# Patient Record
Sex: FEMALE | Race: WHITE | NOT HISPANIC OR LATINO | Employment: UNEMPLOYED | URBAN - METROPOLITAN AREA
[De-identification: names, ages, dates, MRNs, and addresses within clinical notes are randomized per-mention and may not be internally consistent; named-entity substitution may affect disease eponyms.]

---
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
## 2021-01-12 ENCOUNTER — VIRTUAL VISIT (OUTPATIENT)
Dept: PSYCHOLOGY | Facility: CLINIC | Age: 21
End: 2021-01-12
Payer: COMMERCIAL

## 2021-01-12 DIAGNOSIS — F41.1 GENERALIZED ANXIETY DISORDER: Primary | ICD-10-CM

## 2021-01-12 PROCEDURE — 90834 PSYTX W PT 45 MINUTES: CPT | Mod: GT | Performed by: SOCIAL WORKER

## 2021-01-13 NOTE — PROGRESS NOTES
Progress Note    Patient Name: Eli Hood  Date: 1/12/2021     Service Type: Individual      Session Start Time: 12:30 PM  Session End Time: 1:20 PM     Session Length: 50 Minutes    Session #: 4 (Third Episode of Care)    Attendees: Client    Service Modality:  Video Visit    Telemedicine Visit: The client's condition can be safely assessed and treated via synchronous audio and visual telemedicine encounter.      Reason for Telemedicine Visit: The client was only offered a telehealth visit.    Originating Site (Client Location): Client's Home    Distant Site (Provider Location): Provider Remote Setting    Consent:  The client has verbally consented to the potential risks and benefits of telemedicine (video visit) versus in person care. She agreed that her insurance would be billed. She also agreed to make self-payment for services provided, if needed, and she agreed to take responsibility for payment of non-covered services.     The client would like the video invitation sent by: Text to cell phone: [phone]    Mode of Communication:  Video Conference via [employer]    As the provider, I attest to compliance with applicable laws and regulations related to telemedicine.     Treatment Plan Last Reviewed: 11/24/2020  PHQ-9 / LYNNE-7 : Not Completed    DATA  Interactive Complexity: No  Crisis: No       Progress Since Last Session (Related to Symptoms / Goals / Homework):   Symptoms: Worsening : The client reported experiencing sleep disturbance since moving back into the college dorm.    Homework: Achieved / completed to satisfaction. The client reported that she has been using the relaxation exercise daily.      Episode of Care Goals: No improvement - ACTION (Actively working towards change); Intervened by reinforcing change plan / affirming steps taken.     Current / Ongoing Stressors and Concerns:   The client reported that her stress has increased.     Treatment  Objective(s) Addressed in This Session:   The client will learn and apply skills to manage the symptoms of anxiety that she has been experiencing.     Intervention:   Talked about the client returning to live on the college campus. Examined her sleep disturbance. Normalized her body and mind adjusting to sleeping in a new environment. Discussed and reinforced her efforts to use the relaxation exercise daily. Presented didactic information about sleep hygiene. Obtained a commitment from the client to follow the sleep hygiene techniques to improve her sleep.      ASSESSMENT: Current Emotional / Mental Status (status of significant symptoms):   Risk status (Self / Other harm or suicidal ideation)   Client denies current fears or concerns for personal safety.   Client denies current or recent suicidal ideation or behaviors.   Client denies current or recent homicidal ideation or behaviors.   Client denies current or recent self injurious behavior or ideation.   Client denies other safety concerns.   Client reports there has been no change in risk factors since her last session.     Client reports there has been a change in protective factors since her last session. She states that she moved back into the college dorm on 1/9/2021. She indicates that she likes her college classes so far.    Recommended that the client call 911 or go to the local emergency department should there be a change in any of these risk factors.     Appearance:   Appropriate    Eye Contact:   Good    Psychomotor Behavior: Normal    Attitude:   Cooperative    Orientation:   All   Speech    Rate / Production: Normal/ Responsive    Volume:  Normal    Mood:    Normal   Affect:    Appropriate    Thought Content:  Clear    Thought Form:  Coherent  Logical    Insight:    Good      Medication Review:   No current psychiatric medications prescribed.     Medication Compliance:   N/A     Changes in Health Issues:   None reported.     Chemical Use  Review:   Substance Use: Chemical use reviewed. No active concerns identified.      Tobacco Use: No current tobacco use.      Diagnosis:  1. Generalized anxiety disorder      Collateral Reports Completed:   Not Applicable    PLAN: (Patient Tasks / Therapist Tasks / Other)  Follow the sleep hygiene techniques to improve her sleep.        JENN Rosario, MALISSA.                                                         ______________________________________________________________________    Treatment Plan     Client's Name: Eli Hood                    YOB: 2000     Date: 11/24/2020     DSM-V Diagnoses:      300.02 (F41.1) Generalized Anxiety Disorder     Psychosocial / Contextual Factors: The client is a [age] year old,  female. The client resides with her mother, her father and her two sisters in a home in [place], [place]. The client is a sophomore in college. She is currently taking online classes. The client is employed part-time as a  at [employer]. The client reported that she began noticing symptoms of anxiety during her sophomore year of high school. The client reported that she would like assistance learning how to manage the symptoms of anxiety that she has been experiencing.     WHODAS: Not Completed     Referral / Collaboration:  Referral to another professional/service is not indicated at this time.     Anticipated number of session or this episode of care: 12     MeasurableTreatment Goal(s) related to diagnosis / functional impairment(s)  Goal 1: The client will learn and apply skills to manage the symptoms of anxiety that she has been experiencing.    I will know I've met my goal when I don't worry so much.       Objective #A                The client will identify specific fears / thoughts that contribute to feeling anxious.  Status: Continued - Date(s): 11/24/2020      Intervention(s)  Therapist will assist the client in identifying specific  "thoughts/fears that contribute to feeling anxious.     Objective #B  The client will use  worry time  each day for 30 minutes of scheduled worry and then defer obsessive or anxious thinking until the next structured worry time.  Status: Continued - Date(s): 11/24/2020      Intervention(s)  Therapist will teach and support the client in learning and using \"worry time\".     Objective #C  The client will use relaxation strategies two times per day to reduce the physical symptoms of anxiety.  Status: Continued - Date(s): 11/24/2020     Intervention(s)  Therapist will teach and support the client in learning and using relaxation strategies.     Objective #D  The client will use cognitive strategies identified in therapy to challenge anxious thoughts.                      Status: Continued - Date(s): 11/24/2020      Intervention(s)  Therapist will assist the client in learning and applying cognitive strategies to challenge anxious thoughts.     Objective #E  The client will be mindful of the body sensations that she is experiencing when she is feeling anxious.  Status: Continued - Date(s): 11/24/2020      Intervention(s)  Therapist will  and support the client in being mindful of the body sensations that she is experiencing when she is feeling anxious.     Objective #F  The client will learn how to use her senses to ground herself in her body.  Status: Continued - Date(s): 11/24/2020      Intervention(s)  Therapist will  and support the client in learning how to use her senses to ground herself in her body.        The client has verbally agreed to the above plan.        OMEGA Rosario.S.ELIAN., L.I.C.S.W.              November 25, 2020           "

## 2021-02-17 ENCOUNTER — VIRTUAL VISIT (OUTPATIENT)
Dept: PSYCHOLOGY | Facility: CLINIC | Age: 21
End: 2021-02-17
Payer: COMMERCIAL

## 2021-02-17 DIAGNOSIS — F41.1 GENERALIZED ANXIETY DISORDER: Primary | ICD-10-CM

## 2021-02-17 PROCEDURE — 90834 PSYTX W PT 45 MINUTES: CPT | Mod: GT | Performed by: SOCIAL WORKER

## 2021-02-19 NOTE — PROGRESS NOTES
Progress Note    Patient Name: Eli Hood  Date: 2/17/2021     Service Type: Individual      Session Start Time: 11:30 AM  Session End Time: 12:20 PM     Session Length: 50 Minutes    Session #: 5 (Third Episode of Care)    Attendees: Client    Service Modality:  Video Visit    Telemedicine Visit: The client's condition can be safely assessed and treated via synchronous audio and visual telemedicine encounter.      Reason for Telemedicine Visit: The client was only offered a telehealth visit.    Originating Site (Client Location): Client's Home    Distant Site (Provider Location): Provider Remote Setting    Consent:  The client has verbally consented to the potential risks and benefits of telemedicine (video visit) versus in person care. She agreed that her insurance would be billed. She also agreed to make self-payment for services provided, if needed, and she agreed to take responsibility for payment of non-covered services.     The client would like the video invitation sent by: Text to cell phone: [phone]    Mode of Communication:  Video Conference via Appsfire    As the provider, I attest to compliance with applicable laws and regulations related to telemedicine.     Treatment Plan Last Reviewed: 11/24/2020  PHQ-9 / LYNNE-7 : Not Completed    DATA  Interactive Complexity: No  Crisis: No       Progress Since Last Session (Related to Symptoms / Goals / Homework):   Symptoms: Improving : The client reported that her sleep has improved.    Homework: Achieved / completed to satisfaction. The client reported that she did follow the sleep hygiene techniques to improve her sleep.       Episode of Care Goals: Satisfactory progress - ACTION (Actively working towards change); Intervened by reinforcing change plan / affirming steps taken.     Current / Ongoing Stressors and Concerns:   The client reported that her stress has decreased.     Treatment Objective(s) Addressed  in This Session:   The client will learn and apply skills to manage the symptoms of anxiety that she has been experiencing.     Intervention:   Talked about the client's recent stressors including gastrointestinal problems. Discussed and reinforced the client's efforts to follow the sleep hygiene techniques to improve her sleep. Began examining the client's fear of failure.      ASSESSMENT: Current Emotional / Mental Status (status of significant symptoms):   Risk status (Self / Other harm or suicidal ideation)   Client denies current fears or concerns for personal safety.   Client denies current or recent suicidal ideation or behaviors.   Client denies current or recent homicidal ideation or behaviors.   Client denies current or recent self injurious behavior or ideation.   Client denies other safety concerns.   Client reports there has been a change in risk factors since her last session. She states that she has been having gastrointestinal problems.   Client reports there has been a change in protective factors since her last session. She states that she has been socializing regularly with friends.    Recommended that the client call 911 or go to the local emergency department should there be a change in any of these risk factors.     Appearance:   Appropriate    Eye Contact:   Good    Psychomotor Behavior: Normal    Attitude:   Cooperative    Orientation:   All   Speech    Rate / Production: Normal/ Responsive    Volume:  Normal    Mood:    Normal   Affect:    Appropriate    Thought Content:  Clear    Thought Form:  Coherent  Logical    Insight:    Good      Medication Review:   No current psychiatric medications prescribed.     Medication Compliance:   N/A     Changes in Health Issues:   None reported.     Chemical Use Review:   Substance Use: Chemical use reviewed. No active concerns identified.      Tobacco Use: No current tobacco use.      Diagnosis:  1. Generalized anxiety disorder      Collateral Reports  Completed:   Not Applicable    PLAN: (Patient Tasks / Therapist Tasks / Other)  N/A        ANALIA Rosario., MALISSA.                                                         ______________________________________________________________________    Treatment Plan     Client's Name: Eli Hood                    YOB: 2000     Date: 11/24/2020     DSM-V Diagnoses:      300.02 (F41.1) Generalized Anxiety Disorder     Psychosocial / Contextual Factors: The client is a [age] year old,  female. The client resides with her mother, her father and her two sisters in a home in [place], [place]. The client is a sophomore in college. She is currently taking online classes. The client is employed part-time as a  at [employer]. The client reported that she began noticing symptoms of anxiety during her sophomore year of high school. The client reported that she would like assistance learning how to manage the symptoms of anxiety that she has been experiencing.     WHODAS: Not Completed     Referral / Collaboration:  Referral to another professional/service is not indicated at this time.     Anticipated number of session or this episode of care: 12     MeasurableTreatment Goal(s) related to diagnosis / functional impairment(s)  Goal 1: The client will learn and apply skills to manage the symptoms of anxiety that she has been experiencing.    I will know I've met my goal when I don't worry so much.       Objective #A                The client will identify specific fears / thoughts that contribute to feeling anxious.  Status: Continued - Date(s): 11/24/2020      Intervention(s)  Therapist will assist the client in identifying specific thoughts/fears that contribute to feeling anxious.     Objective #B  The client will use  worry time  each day for 30 minutes of scheduled worry and then defer obsessive or anxious thinking until the next structured worry time.  Status: Continued -  "Date(s): 11/24/2020      Intervention(s)  Therapist will teach and support the client in learning and using \"worry time\".     Objective #C  The client will use relaxation strategies two times per day to reduce the physical symptoms of anxiety.  Status: Continued - Date(s): 11/24/2020     Intervention(s)  Therapist will teach and support the client in learning and using relaxation strategies.     Objective #D  The client will use cognitive strategies identified in therapy to challenge anxious thoughts.                      Status: Continued - Date(s): 11/24/2020      Intervention(s)  Therapist will assist the client in learning and applying cognitive strategies to challenge anxious thoughts.     Objective #E  The client will be mindful of the body sensations that she is experiencing when she is feeling anxious.  Status: Continued - Date(s): 11/24/2020      Intervention(s)  Therapist will  and support the client in being mindful of the body sensations that she is experiencing when she is feeling anxious.     Objective #F  The client will learn how to use her senses to ground herself in her body.  Status: Continued - Date(s): 11/24/2020      Intervention(s)  Therapist will  and support the client in learning how to use her senses to ground herself in her body.        The client has verbally agreed to the above plan.        Tiffany Delgado M.S.W., L.I.C.S.W.              November 25, 2020         "

## 2021-03-10 ENCOUNTER — VIRTUAL VISIT (OUTPATIENT)
Dept: PSYCHOLOGY | Facility: CLINIC | Age: 21
End: 2021-03-10
Payer: COMMERCIAL

## 2021-03-10 DIAGNOSIS — F41.1 GENERALIZED ANXIETY DISORDER: Primary | ICD-10-CM

## 2021-03-10 PROCEDURE — 90834 PSYTX W PT 45 MINUTES: CPT | Mod: GT | Performed by: SOCIAL WORKER

## 2021-03-24 ENCOUNTER — VIRTUAL VISIT (OUTPATIENT)
Dept: PSYCHOLOGY | Facility: CLINIC | Age: 21
End: 2021-03-24
Payer: COMMERCIAL

## 2021-03-24 DIAGNOSIS — F41.1 GENERALIZED ANXIETY DISORDER: Primary | ICD-10-CM

## 2021-03-24 PROCEDURE — 90834 PSYTX W PT 45 MINUTES: CPT | Mod: GT | Performed by: SOCIAL WORKER

## 2021-03-25 NOTE — PROGRESS NOTES
Progress Note    Patient Name: Eli Hood  Date: 3/24/2021     Service Type: Individual      Session Start Time: 10:30 AM  Session End Time: 11:20 AM     Session Length: 50 Minutes    Session #: 7 (Third Episode of Care)    Attendees: Client    Service Modality:  Video Visit    Telemedicine Visit: The client's condition can be safely assessed and treated via synchronous audio and visual telemedicine encounter.      Reason for Telemedicine Visit: The client was only offered a telehealth visit.    Originating Site (Client Location): Client's Dormitory Room    Distant Site (Provider Location): Provider Remote Setting    Consent:  The client has verbally consented to the potential risks and benefits of telemedicine (video visit) versus in person care. She agreed that her insurance would be billed. She also agreed to make self-payment for services provided, if needed, and she agreed to take responsibility for payment of non-covered services.     The client would like the video invitation sent by: Text to cell phone: [phone]    Mode of Communication:  Video Conference via [employer]shah[employer]    As the provider, I attest to compliance with applicable laws and regulations related to telemedicine.     Treatment Plan Last Reviewed: 3/10/2021  PHQ-9 / LYNNE-7 : Not Completed    DATA  Interactive Complexity: No  Crisis: No       Progress Since Last Session (Related to Symptoms / Goals / Homework):   Symptoms: Improving : The client reported experiencing improved mood and decreased symptoms of anxiety.    Homework: Did not complete. The client reported that she did not lay in her bed and allow herself to experience and express her grief.       Episode of Care Goals: Satisfactory progress - CONTEMPLATION (Considering change and yet undecided); Intervened by assessing the negative and positive thinking (ambivalence) about behavior change.     Current / Ongoing Stressors and Concerns:   The  client reported that her stress has decreased.      Treatment Objective(s) Addressed in This Session:   The client will identify specific fears / thoughts that contribute to feeling anxious.     Intervention:   Examined the client's continued health issues. Created a plan for the client to take Lactaid when she begins eating lunch later in the day. Talked about the client's desire to speak to her  about her grade. Created a plan for the client to talk to her professor.      ASSESSMENT: Current Emotional / Mental Status (status of significant symptoms):   Risk status (Self / Other harm or suicidal ideation)   Client denies current fears or concerns for personal safety.   Client denies current or recent suicidal ideation or behaviors.   Client denies current or recent homicidal ideation or behaviors.   Client denies current or recent self injurious behavior or ideation.   Client denies other safety concerns.   Client reports there has been no change in risk factors since her last session.    Client reports there has been no change in protective factors since her last session.     Recommended that the client call 911 or go to the local emergency department should there be a change in any of these risk factors.     Appearance:   Appropriate    Eye Contact:   Good    Psychomotor Behavior: Normal    Attitude:   Cooperative    Orientation:   All   Speech    Rate / Production: Normal/ Responsive    Volume:  Normal    Mood:    Normal   Affect:    Appropriate    Thought Content:  Clear    Thought Form:  Coherent  Logical    Insight:    Good      Medication Review:   No current psychiatric medications prescribed.     Medication Compliance:   N/A     Changes in Health Issues:   None reported.     Chemical Use Review:   Substance Use: Chemical use reviewed. No active concerns identified.      Tobacco Use: No current tobacco use.      Diagnosis:  1. Generalized anxiety disorder      Collateral Reports  Completed:   Not Applicable    PLAN: (Patient Tasks / Therapist Tasks / Other)  Take Lactaid when she begins eating lunch later in the day. Talk to her  about her grade.        ANALIA Rosario., MALISSA.                                                         ______________________________________________________________________    Treatment Plan     Client's Name: Eli Hood                    YOB: 2000     Date: 3/10/2021     DSM-V Diagnoses:      300.02 (F41.1) Generalized Anxiety Disorder     Psychosocial / Contextual Factors: The client is a [age] year old,  female. The client resides with her mother, her father and her two sisters in a home in [place], [place]. The client is a sophomore in college at [employer]. The client reported that she began noticing symptoms of anxiety during her sophomore year of high school. The client reported that she would like assistance learning how to manage the symptoms of anxiety that she has been experiencing.     WHODAS: Not Completed     Referral / Collaboration:  Referral to another professional/service is not indicated at this time.     Anticipated number of session or this episode of care: 12     MeasurableTreatment Goal(s) related to diagnosis / functional impairment(s)  Goal 1: The client will learn and apply skills to manage the symptoms of anxiety that she has been experiencing.    I will know I've met my goal when I don't worry so much.       Objective #A                The client will identify specific fears / thoughts that contribute to feeling anxious.  Status: Continued - Date(s): 3/10/2021      Intervention(s)  Therapist will assist the client in identifying specific thoughts/fears that contribute to feeling anxious.     Objective #B  The client will use  worry time  each day for 30 minutes of scheduled worry and then defer obsessive or anxious thinking until the next structured worry  "time.  Status: Continued - Date(s): 3/10/2021      Intervention(s)  Therapist will teach and support the client in learning and using \"worry time\".     Objective #C  The client will use relaxation strategies two times per day to reduce the physical symptoms of anxiety.  Status: Continued - Date(s): 3/10/2021     Intervention(s)  Therapist will teach and support the client in learning and using relaxation strategies.     Objective #D  The client will use cognitive strategies identified in therapy to challenge anxious thoughts.                      Status: Continued - Date(s): 3/10/2021      Intervention(s)  Therapist will assist the client in learning and applying cognitive strategies to challenge anxious thoughts.     Objective #E  The client will be mindful of the body sensations that she is experiencing when she is feeling anxious.  Status: Continued - Date(s): 3/10/2021      Intervention(s)  Therapist will  and support the client in being mindful of the body sensations that she is experiencing when she is feeling anxious.     Objective #F  The client will learn how to use her senses to ground herself in her body.  Status: Continued - Date(s): 3/10/2021      Intervention(s)  Therapist will  and support the client in learning how to use her senses to ground herself in her body.        The client has verbally agreed to the above plan.        Tiffany Delgado M.S.W., L.I.C.S.W.              March 11, 2021     "

## 2021-04-08 ENCOUNTER — VIRTUAL VISIT (OUTPATIENT)
Dept: PSYCHOLOGY | Facility: CLINIC | Age: 21
End: 2021-04-08
Payer: COMMERCIAL

## 2021-04-08 DIAGNOSIS — F41.1 GENERALIZED ANXIETY DISORDER: Primary | ICD-10-CM

## 2021-04-08 PROCEDURE — 90834 PSYTX W PT 45 MINUTES: CPT | Mod: GT | Performed by: SOCIAL WORKER

## 2021-04-09 NOTE — PROGRESS NOTES
Progress Note    Patient Name: Eli Hood  Date: 4/8/2021     Service Type: Individual      Session Start Time: 12:30 PM  Session End Time: 1:15 PM     Session Length: 45 Minutes    Session #: 8 (Third Episode of Care)    Attendees: Client    Service Modality:  Video Visit    Telemedicine Visit: The client's condition can be safely assessed and treated via synchronous audio and visual telemedicine encounter.      Reason for Telemedicine Visit: The client was only offered a telehealth visit.    Originating Site (Client Location): Client's Dormitory Room    Distant Site (Provider Location): Provider Remote Setting    Consent:  The client has verbally consented to the potential risks and benefits of telemedicine (video visit) versus in person care. She agreed that her insurance would be billed. She also agreed to make self-payment for services provided, if needed, and she agreed to take responsibility for payment of non-covered services.     The client would like the video invitation sent by: Text to cell phone: [phone]    Mode of Communication:  Video Conference via Amwell    As the provider, I attest to compliance with applicable laws and regulations related to telemedicine.     Treatment Plan Last Reviewed: 3/10/2021  PHQ-9 / LYNNE-7 : Not Completed    DATA  Interactive Complexity: No  Crisis: No       Progress Since Last Session (Related to Symptoms / Goals / Homework):   Symptoms: Improving : The client reported experiencing improved mood and decreased symptoms of anxiety.    Homework: Achieved / completed to satisfaction. The client reported that she did take Lactaid before she ate lunch on 3/24/2021. She indicated that she also talked to her  about her grade.      Episode of Care Goals: Satisfactory progress - ACTION (Actively working towards change); Intervened by reinforcing change plan / affirming steps taken.     Current / Ongoing Stressors  and Concerns:   The client reported that her stress has decreased. She stated that she was provisionally accepted into the education program at [facility].     Treatment Objective(s) Addressed in This Session:   The client will identify specific fears / thoughts that contribute to feeling anxious.     Intervention:   Talked about the client being provisonally accepted into the education program. Examined the client's continued health issues. Discussed and reinforced the client's efforts to try Lactaid. Obtained a commitment from the client to consider trying a different flavor of Lactaid or a generic brand. Discussed and reinforced her efforts to speak to her  about her grade. Continued examining the client's fear of failure.      ASSESSMENT: Current Emotional / Mental Status (status of significant symptoms):   Risk status (Self / Other harm or suicidal ideation)   Client denies current fears or concerns for personal safety.   Client denies current or recent suicidal ideation or behaviors.   Client denies current or recent homicidal ideation or behaviors.   Client denies current or recent self injurious behavior or ideation.   Client denies other safety concerns.   Client reports there has been no change in risk factors since her last session.    Client reports there has been a change in protective factors since her last session. She states that she was provisionally accepted into the education program at [facility].   Recommended that the client call 911 or go to the local emergency department should there be a change in any of these risk factors.     Appearance:   Appropriate    Eye Contact:   Good    Psychomotor Behavior: Normal    Attitude:   Cooperative    Orientation:   All   Speech    Rate / Production: Normal/ Responsive    Volume:  Normal    Mood:    Normal   Affect:    Appropriate    Thought Content:  Clear    Thought Form:  Coherent  Logical    Insight:    Good       Medication Review:   No current psychiatric medications prescribed.     Medication Compliance:   N/A     Changes in Health Issues:   None reported.     Chemical Use Review:   Substance Use: Chemical use reviewed. No active concerns identified.      Tobacco Use: No current tobacco use.      Diagnosis:  1. Generalized anxiety disorder      Collateral Reports Completed:   Not Applicable    PLAN: (Patient Tasks / Therapist Tasks / Other)  Consider trying a different flavor of Lactaid or a generic brand.        ANALIA Rosario., MALISSA.                                                         ______________________________________________________________________    Treatment Plan     Client's Name: Eli Hood                    YOB: 2000     Date: 3/10/2021     DSM-V Diagnoses:      300.02 (F41.1) Generalized Anxiety Disorder     Psychosocial / Contextual Factors: The client is a [age] year old,  female. The client resides with her mother, her father and her two sisters in a home in [place], [place]. The client is a sophomore in college at [employer]. The client reported that she began noticing symptoms of anxiety during her sophomore year of high school. The client reported that she would like assistance learning how to manage the symptoms of anxiety that she has been experiencing.     WHODAS: Not Completed     Referral / Collaboration:  Referral to another professional/service is not indicated at this time.     Anticipated number of session or this episode of care: 12     MeasurableTreatment Goal(s) related to diagnosis / functional impairment(s)  Goal 1: The client will learn and apply skills to manage the symptoms of anxiety that she has been experiencing.    I will know I've met my goal when I don't worry so much.       Objective #A                The client will identify specific fears / thoughts that contribute to feeling anxious.  Status: Continued -  "Date(s): 3/10/2021      Intervention(s)  Therapist will assist the client in identifying specific thoughts/fears that contribute to feeling anxious.     Objective #B  The client will use  worry time  each day for 30 minutes of scheduled worry and then defer obsessive or anxious thinking until the next structured worry time.  Status: Continued - Date(s): 3/10/2021      Intervention(s)  Therapist will teach and support the client in learning and using \"worry time\".     Objective #C  The client will use relaxation strategies two times per day to reduce the physical symptoms of anxiety.  Status: Continued - Date(s): 3/10/2021     Intervention(s)  Therapist will teach and support the client in learning and using relaxation strategies.     Objective #D  The client will use cognitive strategies identified in therapy to challenge anxious thoughts.                      Status: Continued - Date(s): 3/10/2021      Intervention(s)  Therapist will assist the client in learning and applying cognitive strategies to challenge anxious thoughts.     Objective #E  The client will be mindful of the body sensations that she is experiencing when she is feeling anxious.  Status: Continued - Date(s): 3/10/2021      Intervention(s)  Therapist will  and support the client in being mindful of the body sensations that she is experiencing when she is feeling anxious.     Objective #F  The client will learn how to use her senses to ground herself in her body.  Status: Continued - Date(s): 3/10/2021      Intervention(s)  Therapist will  and support the client in learning how to use her senses to ground herself in her body.        The client has verbally agreed to the above plan.        Tiffany Delgado M.S.ELIAN., L.I.C.S.W.              March 11, 2021   "

## 2021-04-28 ENCOUNTER — VIRTUAL VISIT (OUTPATIENT)
Dept: PSYCHOLOGY | Facility: CLINIC | Age: 21
End: 2021-04-28
Payer: COMMERCIAL

## 2021-04-28 DIAGNOSIS — F41.1 GENERALIZED ANXIETY DISORDER: Primary | ICD-10-CM

## 2021-04-28 PROCEDURE — 90834 PSYTX W PT 45 MINUTES: CPT | Mod: GT | Performed by: SOCIAL WORKER

## 2021-04-29 NOTE — PROGRESS NOTES
Progress Note    Patient Name: Eli Hood  Date: 4/28/2021     Service Type: Individual      Session Start Time: 3:30 PM  Session End Time: 4:20 PM     Session Length: 50 Minutes    Session #: 9 (Third Episode of Care)    Attendees: Client    Service Modality:  Video Visit    Telemedicine Visit: The client's condition can be safely assessed and treated via synchronous audio and visual telemedicine encounter.      Reason for Telemedicine Visit: The client was only offered a telehealth visit.    Originating Site (Client Location): Client's Dormitory Room    Distant Site (Provider Location): Provider Remote Setting    Consent:  The client has verbally consented to the potential risks and benefits of telemedicine (video visit) versus in person care. She agreed that her insurance would be billed. She also agreed to make self-payment for services provided, if needed, and she agreed to take responsibility for payment of non-covered services.     The client would like the video invitation sent by: Text to cell phone: [phone]    Mode of Communication:  Video Conference via [employer]    As the provider, I attest to compliance with applicable laws and regulations related to telemedicine.     Treatment Plan Last Reviewed: 3/10/2021  PHQ-9 / LYNNE-7 : Not Completed    DATA  Interactive Complexity: No  Crisis: No       Progress Since Last Session (Related to Symptoms / Goals / Homework):   Symptoms: No change : The client reported no change in her symptoms since the previous video visit.    Homework: Did not complete. The client reported that she did not consider trying a different flavor of Lactaid or a generic brand.      Episode of Care Goals: No improvement - CONTEMPLATION (Considering change and yet undecided); Intervened by assessing the negative and positive thinking (ambivalence) about behavior change.     Current / Ongoing Stressors and Concerns:   The client reported  that her stress has increased. She stated that she has a final paper due in one of her classes later in the day.     Treatment Objective(s) Addressed in This Session:   The client will identify specific fears / thoughts that contribute to feeling anxious.     Intervention:   Talked about the client's recent stressors. Continued examining the client's fear of failure. Discussed the client's continued health issues.       ASSESSMENT: Current Emotional / Mental Status (status of significant symptoms):   Risk status (Self / Other harm or suicidal ideation)   Client denies current fears or concerns for personal safety.   Client denies current or recent suicidal ideation or behaviors.   Client denies current or recent homicidal ideation or behaviors.   Client denies current or recent self injurious behavior or ideation.   Client denies other safety concerns.   Client reports there has been no change in risk factors since her last session.    Client reports there has been a change in protective factors since her last session. She states that she is looking forward to moving home for the summer.   Recommended that the client call 911 or go to the local emergency department should there be a change in any of these risk factors.     Appearance:   Appropriate    Eye Contact:   Good    Psychomotor Behavior: Normal    Attitude:   Cooperative    Orientation:   All   Speech    Rate / Production: Normal/ Responsive    Volume:  Normal    Mood:    Normal   Affect:    Appropriate    Thought Content:  Clear    Thought Form:  Coherent  Logical    Insight:    Good      Medication Review:   No current psychiatric medications prescribed.     Medication Compliance:   N/A     Changes in Health Issues:   None reported.     Chemical Use Review:   Substance Use: Chemical use reviewed. No active concerns identified.      Tobacco Use: No current tobacco use.      Diagnosis:  1. Generalized anxiety disorder      Collateral Reports Completed:   Not  Applicable    PLAN: (Patient Tasks / Therapist Tasks / Other)  N/A        JENN Rosario, MALISSA.                                                         ______________________________________________________________________    Treatment Plan     Client's Name: Eli Hood                    YOB: 2000     Date: 3/10/2021     DSM-V Diagnoses:      300.02 (F41.1) Generalized Anxiety Disorder     Psychosocial / Contextual Factors: The client is a [age] year old,  female. The client resides with her mother, her father and her two sisters in a home in [place], [place]. The client is a sophomore in college at [employer]. The client reported that she began noticing symptoms of anxiety during her sophomore year of high school. The client reported that she would like assistance learning how to manage the symptoms of anxiety that she has been experiencing.     WHODAS: Not Completed     Referral / Collaboration:  Referral to another professional/service is not indicated at this time.     Anticipated number of session or this episode of care: 12     MeasurableTreatment Goal(s) related to diagnosis / functional impairment(s)  Goal 1: The client will learn and apply skills to manage the symptoms of anxiety that she has been experiencing.    I will know I've met my goal when I don't worry so much.       Objective #A                The client will identify specific fears / thoughts that contribute to feeling anxious.  Status: Continued - Date(s): 3/10/2021      Intervention(s)  Therapist will assist the client in identifying specific thoughts/fears that contribute to feeling anxious.     Objective #B  The client will use  worry time  each day for 30 minutes of scheduled worry and then defer obsessive or anxious thinking until the next structured worry time.  Status: Continued - Date(s): 3/10/2021      Intervention(s)  Therapist will teach and support the client in learning and  "using \"worry time\".     Objective #C  The client will use relaxation strategies two times per day to reduce the physical symptoms of anxiety.  Status: Continued - Date(s): 3/10/2021     Intervention(s)  Therapist will teach and support the client in learning and using relaxation strategies.     Objective #D  The client will use cognitive strategies identified in therapy to challenge anxious thoughts.                      Status: Continued - Date(s): 3/10/2021      Intervention(s)  Therapist will assist the client in learning and applying cognitive strategies to challenge anxious thoughts.     Objective #E  The client will be mindful of the body sensations that she is experiencing when she is feeling anxious.  Status: Continued - Date(s): 3/10/2021      Intervention(s)  Therapist will  and support the client in being mindful of the body sensations that she is experiencing when she is feeling anxious.     Objective #F  The client will learn how to use her senses to ground herself in her body.  Status: Continued - Date(s): 3/10/2021      Intervention(s)  Therapist will  and support the client in learning how to use her senses to ground herself in her body.        The client has verbally agreed to the above plan.        Tiffany Delgado M.S.ELIAN., L.I.C.S.W.              March 11, 2021   "

## 2021-05-12 ENCOUNTER — VIRTUAL VISIT (OUTPATIENT)
Dept: PSYCHOLOGY | Facility: CLINIC | Age: 21
End: 2021-05-12
Payer: COMMERCIAL

## 2021-05-12 DIAGNOSIS — F41.1 GENERALIZED ANXIETY DISORDER: Primary | ICD-10-CM

## 2021-05-12 PROCEDURE — 90834 PSYTX W PT 45 MINUTES: CPT | Mod: GT | Performed by: SOCIAL WORKER

## 2021-05-14 NOTE — PROGRESS NOTES
Progress Note    Patient Name: Eli Hood  Date: 5/12/2021     Service Type: Individual      Session Start Time: 1:30 PM  Session End Time: 2:20 PM     Session Length: 50 Minutes    Session #: 10 (Third Episode of Care)    Attendees: Client    Service Modality:  Video Visit    Telemedicine Visit: The client's condition can be safely assessed and treated via synchronous audio and visual telemedicine encounter.      Reason for Telemedicine Visit: The client was only offered a telehealth visit.    Originating Site (Client Location): Client's Home    Distant Site (Provider Location): Provider Remote Setting    Consent:  The client has verbally consented to the potential risks and benefits of telemedicine (video visit) versus in person care. She agreed that her insurance would be billed. She also agreed to make self-payment for services provided, if needed, and she agreed to take responsibility for payment of non-covered services.     The client would like the video invitation sent by: Text to cell phone: [phone]    Mode of Communication:  Video Conference via [employer]    As the provider, I attest to compliance with applicable laws and regulations related to telemedicine.     Treatment Plan Last Reviewed: 3/10/2021  PHQ-9 / LYNNE-7 : Not Completed    DATA  Interactive Complexity: No  Crisis: No       Progress Since Last Session (Related to Symptoms / Goals / Homework):   Symptoms: Improving : The client reported experiencing decreased symptoms of anxiety and improved mood since she completed her college classes for the semester.    Homework: N/A      Episode of Care Goals: Satisfactory progress - ACTION (Actively working towards change); Intervened by reinforcing change plan / affirming steps taken.     Current / Ongoing Stressors and Concerns:   The client reported that her stress has decreased overall. She indicated that she is dealing with an interpersonal stressor.       Treatment Objective(s) Addressed in This Session:   The client will identify specific fears / thoughts that contribute to feeling anxious.     Intervention:   Discussed the client returning to live at home and the positive effect on her mood. Examined her decreased symptoms of anxiety. Talked at length about a recent interpersonal stressor for the client. Determined ways to address the stressor. Obtained a commitment from the client to speak to her uncle for support.      ASSESSMENT: Current Emotional / Mental Status (status of significant symptoms):   Risk status (Self / Other harm or suicidal ideation)   Client denies current fears or concerns for personal safety.   Client denies current or recent suicidal ideation or behaviors.   Client denies current or recent homicidal ideation or behaviors.   Client denies current or recent self injurious behavior or ideation.   Client denies other safety concerns.   Client reports there has been no change in risk factors since her last session.    Client reports there has been a change in protective factors since her last session. She states that she has moved home for the summer.   Recommended that the client call 911 or go to the local emergency department should there be a change in any of these risk factors.     Appearance:   Appropriate    Eye Contact:   Good    Psychomotor Behavior: Normal    Attitude:   Cooperative    Orientation:   All   Speech    Rate / Production: Normal/ Responsive    Volume:  Normal    Mood:    Normal   Affect:    Appropriate    Thought Content:  Clear    Thought Form:  Coherent  Logical    Insight:    Good      Medication Review:   No current psychiatric medications prescribed.     Medication Compliance:   N/A     Changes in Health Issues:   None reported.     Chemical Use Review:   Substance Use: Chemical use reviewed. No active concerns identified.      Tobacco Use: No current tobacco use.      Diagnosis:  1. Generalized anxiety disorder       Collateral Reports Completed:   Not Applicable    PLAN: (Patient Tasks / Therapist Tasks / Other)  Speak to her uncle for support about the recent interpersonal stressor.        Tiffany Delgado M.S.ELIAN., SHARA.ELIAN.                                                         ______________________________________________________________________    Treatment Plan     Client's Name: Eli Hood                    YOB: 2000     Date: 3/10/2021     DSM-V Diagnoses:      300.02 (F41.1) Generalized Anxiety Disorder     Psychosocial / Contextual Factors: The client is a [age] year old,  female. The client resides with her mother, her father and her two sisters in a home in [place], [place]. The client is a sophomore in college at [employer]. The client reported that she began noticing symptoms of anxiety during her sophomore year of high school. The client reported that she would like assistance learning how to manage the symptoms of anxiety that she has been experiencing.     WHODAS: Not Completed     Referral / Collaboration:  Referral to another professional/service is not indicated at this time.     Anticipated number of session or this episode of care: 12     MeasurableTreatment Goal(s) related to diagnosis / functional impairment(s)  Goal 1: The client will learn and apply skills to manage the symptoms of anxiety that she has been experiencing.    I will know I've met my goal when I don't worry so much.       Objective #A                The client will identify specific fears / thoughts that contribute to feeling anxious.  Status: Continued - Date(s): 3/10/2021      Intervention(s)  Therapist will assist the client in identifying specific thoughts/fears that contribute to feeling anxious.     Objective #B  The client will use  worry time  each day for 30 minutes of scheduled worry and then defer obsessive or anxious thinking until the next structured worry time.  Status:  "Continued - Date(s): 3/10/2021      Intervention(s)  Therapist will teach and support the client in learning and using \"worry time\".     Objective #C  The client will use relaxation strategies two times per day to reduce the physical symptoms of anxiety.  Status: Continued - Date(s): 3/10/2021     Intervention(s)  Therapist will teach and support the client in learning and using relaxation strategies.     Objective #D  The client will use cognitive strategies identified in therapy to challenge anxious thoughts.                      Status: Continued - Date(s): 3/10/2021      Intervention(s)  Therapist will assist the client in learning and applying cognitive strategies to challenge anxious thoughts.     Objective #E  The client will be mindful of the body sensations that she is experiencing when she is feeling anxious.  Status: Continued - Date(s): 3/10/2021      Intervention(s)  Therapist will  and support the client in being mindful of the body sensations that she is experiencing when she is feeling anxious.     Objective #F  The client will learn how to use her senses to ground herself in her body.  Status: Continued - Date(s): 3/10/2021      Intervention(s)  Therapist will  and support the client in learning how to use her senses to ground herself in her body.        The client has verbally agreed to the above plan.        Tiffany Delgado M.S.ELIAN., L.I.C.S.W.              March 11, 2021   "

## 2021-06-14 ENCOUNTER — VIRTUAL VISIT (OUTPATIENT)
Dept: PSYCHOLOGY | Facility: CLINIC | Age: 21
End: 2021-06-14
Payer: COMMERCIAL

## 2021-06-14 DIAGNOSIS — F41.1 GENERALIZED ANXIETY DISORDER: Primary | ICD-10-CM

## 2021-06-14 PROCEDURE — 90834 PSYTX W PT 45 MINUTES: CPT | Mod: GT | Performed by: SOCIAL WORKER

## 2021-06-14 NOTE — PROGRESS NOTES
Progress Note    Patient Name: Eli Hood  Date: 6/14/2021     Service Type: Individual      Session Start Time: 10:00 AM  Session End Time: 10:40 AM     Session Length: 40 Minutes    Session #: 11 (Third Episode of Care)    Attendees: Client    Service Modality:  Video Visit    Telemedicine Visit: The client's condition can be safely assessed and treated via synchronous audio and visual telemedicine encounter.      Reason for Telemedicine Visit: The client was only offered a telehealth visit.    Originating Site (Client Location): Client's Home    Distant Site (Provider Location): Provider Remote Setting    Consent:  The client has verbally consented to the potential risks and benefits of telemedicine (video visit) versus in person care. She agreed that her insurance would be billed. She also agreed to make self-payment for services provided, if needed, and she agreed to take responsibility for payment of non-covered services.     The client would like the video invitation sent by: Text to cell phone: [phone]    Mode of Communication:  Video Conference via [employer]    As the provider, I attest to compliance with applicable laws and regulations related to telemedicine.     Treatment Plan Last Reviewed: 6/14/2021  PHQ-9 / LYNNE-7 : Not Completed    DATA  Interactive Complexity: No  Crisis: No       Progress Since Last Session (Related to Symptoms / Goals / Homework):   Symptoms: Improving : The client reported experiencing continued decreased symptoms of anxiety and improved mood.    Homework: Did not complete. The client reported that she did not speak to her uncle for support about the recent interpersonal stressor.      Episode of Care Goals: Satisfactory progress - PREPARATION (Decided to change - considering how); Intervened by negotiating a change plan and determining options / strategies for behavior change, identifying triggers, exploring social supports, and  working towards setting a date to begin behavior change.     Current / Ongoing Stressors and Concerns:   The client reported that her stress is low.      Treatment Objective(s) Addressed in This Session:   The client will identify specific fears / thoughts that contribute to feeling anxious.     Intervention:   Discussed the client's continued improved mood and decreased symptoms of anxiety. Examined the client's continued health issues. Obtained a commitment from the client to keep track of what she is eating and how she is feeling physically. Talked about a number of upcoming events in the client's life that she has to look forward to.      ASSESSMENT: Current Emotional / Mental Status (status of significant symptoms):   Risk status (Self / Other harm or suicidal ideation)   Client denies current fears or concerns for personal safety.   Client denies current or recent suicidal ideation or behaviors.   Client denies current or recent homicidal ideation or behaviors.   Client denies current or recent self injurious behavior or ideation.   Client denies other safety concerns.   Client reports there has been no change in risk factors since her last session.    Client reports there has been a change in protective factors since her last session. She states that she has a number of upcoming events in her life to look forward to.   Recommended that the client call 911 or go to the local emergency department should there be a change in any of these risk factors.     Appearance:   Appropriate    Eye Contact:   Good    Psychomotor Behavior: Normal    Attitude:   Cooperative    Orientation:   All   Speech    Rate / Production: Normal/ Responsive    Volume:  Normal    Mood:    Normal   Affect:    Appropriate    Thought Content:  Clear    Thought Form:  Coherent  Logical    Insight:    Good      Medication Review:   No current psychiatric medications prescribed.     Medication Compliance:   N/A     Changes in Health  Issues:   None reported.     Chemical Use Review:   Substance Use: Chemical use reviewed. No active concerns identified.      Tobacco Use: No current tobacco use.      Diagnosis:  1. Generalized anxiety disorder      Collateral Reports Completed:   Not Applicable    PLAN: (Patient Tasks / Therapist Tasks / Other)  Keep track of what she is eating and how she is feeling physically.      ANALIA Rosario., MALISSA.                                                         ______________________________________________________________________    Treatment Plan     Client's Name: Eli Hood                    YOB: 2000     Date: 6/14/2021     DSM-V Diagnoses:      300.02 (F41.1) Generalized Anxiety Disorder     Psychosocial / Contextual Factors: The client is a [age] year old,  female. The client resides with her mother, her father and her two sisters in a home in [place], [place]. 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 The client reported that she began noticing symptoms of anxiety during her sophomore year of high school. The client reported that she would like assistance learning how to manage the symptoms of anxiety that she has been experiencing.     WHODAS: Not Completed     Referral / Collaboration:  Referral to another professional/service is not indicated at this time.     Anticipated number of session or this episode of care: 12     MeasurableTreatment Goal(s) related to diagnosis / functional impairment(s)  Goal 1: The client will learn and apply skills to manage the symptoms of anxiety that she has been experiencing.    I will know I've met my goal when I don't worry so much.       Objective #A                The client will identify specific fears / thoughts that contribute to feeling anxious.  Status: Continued - Date(s): 6/14/2021      Intervention(s)  Therapist will assist the client in identifying specific thoughts/fears that contribute  "to feeling anxious.     Objective #B  The client will use  worry time  each day for 30 minutes of scheduled worry and then defer obsessive or anxious thinking until the next structured worry time.  Status: Continued - Date(s): 6/14/2021      Intervention(s)  Therapist will teach and support the client in learning and using \"worry time\".     Objective #C  The client will use relaxation strategies two times per day to reduce the physical symptoms of anxiety.  Status: Continued - Date(s): 6/14/2021     Intervention(s)  Therapist will teach and support the client in learning and using relaxation strategies.     Objective #D  The client will use cognitive strategies identified in therapy to challenge anxious thoughts.                      Status: Continued - Date(s): 6/14/2021      Intervention(s)  Therapist will assist the client in learning and applying cognitive strategies to challenge anxious thoughts.     Objective #E  The client will be mindful of the body sensations that she is experiencing when she is feeling anxious.  Status: Continued - Date(s): 6/14/2021      Intervention(s)  Therapist will  and support the client in being mindful of the body sensations that she is experiencing when she is feeling anxious.     Objective #F  The client will learn how to use her senses to ground herself in her body.  Status: Continued - Date(s): 6/14/2021      Intervention(s)  Therapist will  and support the client in learning how to use her senses to ground herself in her body.        The client has verbally agreed to the above plan.        OMEGA Rosario.SJAILENE., L.I.C.S.W.              June 14, 2021   "

## 2021-07-06 ENCOUNTER — VIRTUAL VISIT (OUTPATIENT)
Dept: PSYCHOLOGY | Facility: CLINIC | Age: 21
End: 2021-07-06
Payer: COMMERCIAL

## 2021-07-06 DIAGNOSIS — F41.1 GENERALIZED ANXIETY DISORDER: Primary | ICD-10-CM

## 2021-07-06 PROCEDURE — 90834 PSYTX W PT 45 MINUTES: CPT | Mod: GT | Performed by: SOCIAL WORKER

## 2021-07-07 NOTE — PROGRESS NOTES
Progress Note    Patient Name: Eli Hood  Date: 7/6/2021     Service Type: Individual      Session Start Time: 1:30 PM  Session End Time: 2:15 PM     Session Length: 45 Minutes    Session #: 12 (Third Episode of Care)    Attendees: Client    Service Modality:  Video Visit    Telemedicine Visit: The client's condition can be safely assessed and treated via synchronous audio and visual telemedicine encounter.      Reason for Telemedicine Visit: The client was only offered a telehealth visit.    Originating Site (Client Location): Client's Home    Distant Site (Provider Location): Provider Remote Setting    Consent:  The client has verbally consented to the potential risks and benefits of telemedicine (video visit) versus in person care. She agreed that her insurance would be billed. She also agreed to make self-payment for services provided, if needed, and she agreed to take responsibility for payment of non-covered services.     The client would like the video invitation sent by: Text to cell phone: [phone]    Mode of Communication:  Video Conference via [employer]    As the provider, I attest to compliance with applicable laws and regulations related to telemedicine.     Treatment Plan Last Reviewed: 6/14/2021  PHQ-9 / LYNNE-7 : Not Completed    DATA  Interactive Complexity: No  Crisis: No       Progress Since Last Session (Related to Symptoms / Goals / Homework):   Symptoms: No change : The client reported no change in her symptoms since the previous video visit.    Homework: Achieved / completed to satisfaction. The client reported that she did keep track of what she was eating and how she was feeling physically.      Episode of Care Goals: No improvement - ACTION (Actively working towards change); Intervened by reinforcing change plan / affirming steps taken.     Current / Ongoing Stressors and Concerns:   The client reported that her school related stress has  increased.      Treatment Objective(s) Addressed in This Session:   The client will identify specific fears / thoughts that contribute to feeling anxious.     Intervention:   Examined the client's school related stress. Discussed and reinforced the client's efforts to keep track of what she was eating and how she was feeling physically. Obtained a commitment from the client to try taking a pill for lactose intolerance.       ASSESSMENT: Current Emotional / Mental Status (status of significant symptoms):   Risk status (Self / Other harm or suicidal ideation)   Client denies current fears or concerns for personal safety.   Client denies current or recent suicidal ideation or behaviors.   Client denies current or recent homicidal ideation or behaviors.   Client denies current or recent self injurious behavior or ideation.   Client denies other safety concerns.   Client reports there has been no change in risk factors since her last session.    Client reports there has been no change in protective factors since her last session.    Recommended that the client call 911 or go to the local emergency department should there be a change in any of these risk factors.     Appearance:   Appropriate    Eye Contact:   Good    Psychomotor Behavior: Normal    Attitude:   Cooperative    Orientation:   All   Speech    Rate / Production: Normal/ Responsive    Volume:  Normal    Mood:    Normal   Affect:    Appropriate    Thought Content:  Clear    Thought Form:  Coherent  Logical    Insight:    Good      Medication Review:   No current psychiatric medications prescribed.     Medication Compliance:   N/A     Changes in Health Issues:   None reported.     Chemical Use Review:   Substance Use: Chemical use reviewed. No active concerns identified.      Tobacco Use: No current tobacco use.      Diagnosis:  1. Generalized anxiety disorder      Collateral Reports Completed:   Not Applicable    PLAN: (Patient Tasks / Therapist Tasks /  "Other)  Try taking a pill for lactose intolerance.      ANALIA Rosario., MALISSA.                                                         ______________________________________________________________________    Treatment Plan     Client's Name: Eli Hood                    YOB: 2000     Date: 6/14/2021     DSM-V Diagnoses:      300.02 (F41.1) Generalized Anxiety Disorder     Psychosocial / Contextual Factors: The client is a [age] year old,  female. The client resides with her mother, her father and her two sisters in a home in [place], [place]. 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 The client reported that she began noticing symptoms of anxiety during her sophomore year of high school. The client reported that she would like assistance learning how to manage the symptoms of anxiety that she has been experiencing.     WHODAS: Not Completed     Referral / Collaboration:  Referral to another professional/service is not indicated at this time.     Anticipated number of session or this episode of care: 12     MeasurableTreatment Goal(s) related to diagnosis / functional impairment(s)  Goal 1: The client will learn and apply skills to manage the symptoms of anxiety that she has been experiencing.    I will know I've met my goal when I don't worry so much.       Objective #A                The client will identify specific fears / thoughts that contribute to feeling anxious.  Status: Continued - Date(s): 6/14/2021      Intervention(s)  Therapist will assist the client in identifying specific thoughts/fears that contribute to feeling anxious.     Objective #B  The client will use  worry time  each day for 30 minutes of scheduled worry and then defer obsessive or anxious thinking until the next structured worry time.  Status: Continued - Date(s): 6/14/2021      Intervention(s)  Therapist will teach and support the client in learning and using \"worry " "time\".     Objective #C  The client will use relaxation strategies two times per day to reduce the physical symptoms of anxiety.  Status: Continued - Date(s): 6/14/2021     Intervention(s)  Therapist will teach and support the client in learning and using relaxation strategies.     Objective #D  The client will use cognitive strategies identified in therapy to challenge anxious thoughts.                      Status: Continued - Date(s): 6/14/2021      Intervention(s)  Therapist will assist the client in learning and applying cognitive strategies to challenge anxious thoughts.     Objective #E  The client will be mindful of the body sensations that she is experiencing when she is feeling anxious.  Status: Continued - Date(s): 6/14/2021      Intervention(s)  Therapist will  and support the client in being mindful of the body sensations that she is experiencing when she is feeling anxious.     Objective #F  The client will learn how to use her senses to ground herself in her body.  Status: Continued - Date(s): 6/14/2021      Intervention(s)  Therapist will  and support the client in learning how to use her senses to ground herself in her body.        The client has verbally agreed to the above plan.        OMEGA Rosario.S.ELIAN., L.I.C.S.W.              June 14, 2021   "

## 2021-07-27 ENCOUNTER — VIRTUAL VISIT (OUTPATIENT)
Dept: PSYCHOLOGY | Facility: CLINIC | Age: 21
End: 2021-07-27
Payer: COMMERCIAL

## 2021-07-27 DIAGNOSIS — F41.1 GENERALIZED ANXIETY DISORDER: Primary | ICD-10-CM

## 2021-07-27 PROCEDURE — 90834 PSYTX W PT 45 MINUTES: CPT | Mod: GT | Performed by: SOCIAL WORKER

## 2021-07-28 NOTE — PROGRESS NOTES
Progress Note    Patient Name: Eli Hood  Date: 7/27/2021     Service Type: Individual      Session Start Time: 3:30 PM  Session End Time: 4:15 PM     Session Length: 45 Minutes    Session #: 13 (Third Episode of Care)    Attendees: Client    Service Modality:  Video Visit    Telemedicine Visit: The client's condition can be safely assessed and treated via synchronous audio and visual telemedicine encounter.      Reason for Telemedicine Visit: The client was only offered a telehealth visit.    Originating Site (Client Location): Client's Home    Distant Site (Provider Location): Provider Remote Setting    Consent:  The client has verbally consented to the potential risks and benefits of telemedicine (video visit) versus in person care. She agreed that her insurance would be billed. She also agreed to make self-payment for services provided, if needed, and she agreed to take responsibility for payment of non-covered services.     The client would like the video invitation sent by: Text to cell phone: [phone]    Mode of Communication:  Video Conference via [employer]    As the provider, I attest to compliance with applicable laws and regulations related to telemedicine.     Treatment Plan Last Reviewed: 6/14/2021  PHQ-9 / LYNNE-7 : Not Completed    DATA  Interactive Complexity: No  Crisis: No       Progress Since Last Session (Related to Symptoms / Goals / Homework):   Symptoms: Worsening : The client reported experiencing increased worry thoughts about the upcoming school year.    Homework: Did not complete. The client reported that she did not try taking a pill for lactose intolerance.      Episode of Care Goals: No improvement - RELAPSE (Returned to unhealthy behavior); Intervened by reassessing readiness to change and identifying appropriate stage.  Identified reasons for relapse, successes, and change talk.     Current / Ongoing Stressors and Concerns:   The  client reported that her school related stress has continued to increase.      Treatment Objective(s) Addressed in This Session:   The client will identify specific fears / thoughts that contribute to feeling anxious.     Intervention:   Talked about the client's recent vacation. Examined the client's increased worry thoughts and school related stress. Created a plan for the client to attempt to call and/or e-mail the department of education at the [facility]. Obtained a commitment from the client to follow the plan.      ASSESSMENT: Current Emotional / Mental Status (status of significant symptoms):   Risk status (Self / Other harm or suicidal ideation)   Client denies current fears or concerns for personal safety.   Client denies current or recent suicidal ideation or behaviors.   Client denies current or recent homicidal ideation or behaviors.   Client denies current or recent self injurious behavior or ideation.   Client denies other safety concerns.   Client reports there has been a change in risk factors since her last session. She states that her school related stress has continued to increase.   Client reports there has been a change in protective factors since her last session. She states that, overall, she enjoyed her recent cruise. She indicates that she had no gastrointestinal issues when she was on the cruise.   Recommended that the client call 911 or go to the local emergency department should there be a change in any of these risk factors.     Appearance:   Appropriate    Eye Contact:   Good    Psychomotor Behavior: Normal    Attitude:   Cooperative    Orientation:   All   Speech    Rate / Production: Normal/ Responsive    Volume:  Normal    Mood:    Mildly Anxious   Affect:    Appropriate    Thought Content:  Clear    Thought Form:  Coherent  Logical    Insight:    Good      Medication Review:   No current psychiatric medications prescribed.     Medication Compliance:   N/A     Changes in Health  Issues:   None reported.     Chemical Use Review:   Substance Use: Chemical use reviewed. No active concerns identified.      Tobacco Use: No current tobacco use.      Diagnosis:  1. Generalized anxiety disorder      Collateral Reports Completed:   Not Applicable    PLAN: (Patient Tasks / Therapist Tasks / Other)  Call and/or e-mail the department of education at the [facility].      ANALIA Rosario., MALISSA.                                                         ______________________________________________________________________    Treatment Plan     Client's Name: Eli Hood                    YOB: 2000     Date: 6/14/2021     DSM-V Diagnoses:      300.02 (F41.1) Generalized Anxiety Disorder     Psychosocial / Contextual Factors: The client is a [age] year old,  female. The client resides with her mother, her father and her two sisters in a home in [place], [place]. The client will be a odilon in college at [employer]. The client reported that she began noticing symptoms of anxiety during her sophomore year of high school. The client reported that she would like assistance learning how to manage the symptoms of anxiety that she has been experiencing.     WHODAS: Not Completed     Referral / Collaboration:  Referral to another professional/service is not indicated at this time.     Anticipated number of session or this episode of care: 12     MeasurableTreatment Goal(s) related to diagnosis / functional impairment(s)  Goal 1: The client will learn and apply skills to manage the symptoms of anxiety that she has been experiencing.    I will know I've met my goal when I don't worry so much.       Objective #A                The client will identify specific fears / thoughts that contribute to feeling anxious.  Status: Continued - Date(s): 6/14/2021      Intervention(s)  Therapist will assist the client in identifying specific thoughts/fears that contribute to  "feeling anxious.     Objective #B  The client will use  worry time  each day for 30 minutes of scheduled worry and then defer obsessive or anxious thinking until the next structured worry time.  Status: Continued - Date(s): 6/14/2021      Intervention(s)  Therapist will teach and support the client in learning and using \"worry time\".     Objective #C  The client will use relaxation strategies two times per day to reduce the physical symptoms of anxiety.  Status: Continued - Date(s): 6/14/2021     Intervention(s)  Therapist will teach and support the client in learning and using relaxation strategies.     Objective #D  The client will use cognitive strategies identified in therapy to challenge anxious thoughts.                      Status: Continued - Date(s): 6/14/2021      Intervention(s)  Therapist will assist the client in learning and applying cognitive strategies to challenge anxious thoughts.     Objective #E  The client will be mindful of the body sensations that she is experiencing when she is feeling anxious.  Status: Continued - Date(s): 6/14/2021      Intervention(s)  Therapist will  and support the client in being mindful of the body sensations that she is experiencing when she is feeling anxious.     Objective #F  The client will learn how to use her senses to ground herself in her body.  Status: Continued - Date(s): 6/14/2021      Intervention(s)  Therapist will  and support the client in learning how to use her senses to ground herself in her body.        The client has verbally agreed to the above plan.        OMEGA Rosario.S.ELIAN., L.I.C.S.W.              June 14, 2021   "

## 2021-09-08 ENCOUNTER — VIRTUAL VISIT (OUTPATIENT)
Dept: PSYCHOLOGY | Facility: CLINIC | Age: 21
End: 2021-09-08
Payer: COMMERCIAL

## 2021-09-08 DIAGNOSIS — F41.1 GENERALIZED ANXIETY DISORDER: Primary | ICD-10-CM

## 2021-09-08 PROCEDURE — 90834 PSYTX W PT 45 MINUTES: CPT | Mod: GT | Performed by: SOCIAL WORKER

## 2021-09-13 NOTE — PROGRESS NOTES
Progress Note    Patient Name: Eli Hood  Date: 9/8/2021     Service Type: Individual      Session Start Time: 8:30 AM  Session End Time: 9:15 AM     Session Length: 45 Minutes    Session #: 14 (Third Episode of Care)    Attendees: Client    Service Modality:  Video Visit    Telemedicine Visit: The client's condition can be safely assessed and treated via synchronous audio and visual telemedicine encounter.      Reason for Telemedicine Visit: The client was only offered a telehealth visit.    Originating Site (Client Location): Client's Apartment    Distant Site (Provider Location): Provider Remote Setting    Consent:  The client has verbally consented to the potential risks and benefits of telemedicine (video visit) versus in person care. She agreed that her insurance would be billed. She also agreed to make self-payment for services provided, if needed, and she agreed to take responsibility for payment of non-covered services.     The client would like the video invitation sent by: Text to cell phone: [phone]    Mode of Communication:  Video Conference via [employer]    As the provider, I attest to compliance with applicable laws and regulations related to telemedicine.     Treatment Plan Last Reviewed: 6/14/2021  PHQ-9 / LYNNE-7 : Not Completed    DATA  Interactive Complexity: No  Crisis: No       Progress Since Last Session (Related to Symptoms / Goals / Homework):   Symptoms: Improving : The client reported experiencing slightly decreased symptoms of anxiety.    Homework: Achieved / completed to satisfaction. The client reported that she did call and e-mail the department of education at the [employer].      Episode of Care Goals: Minimal progress - ACTION (Actively working towards change); Intervened by reinforcing change plan / affirming steps taken.     Current / Ongoing Stressors and Concerns:   The client reported that her school related stress has decreased.       Treatment Objective(s) Addressed in This Session:   The client will identify specific fears / thoughts that contribute to feeling anxious.     Intervention:   Talked about the client's adjustment to returning to college. Discussed and reinforced the client's efforts to manage any symptoms of anxiety she has been experiencing. Examined the client's eating habits. Obtained a commitment from the client to eat a banana in the morning along with her regular eating routine.      ASSESSMENT: Current Emotional / Mental Status (status of significant symptoms):   Risk status (Self / Other harm or suicidal ideation)   Client denies current fears or concerns for personal safety.   Client denies current or recent suicidal ideation or behaviors.   Client denies current or recent homicidal ideation or behaviors.   Client denies current or recent self injurious behavior or ideation.   Client denies other safety concerns.   Client reports there has been no change in risk factors since her last session.    Client reports there has been a change in protective factors since her last session. She states that her classes and her new living situation is going well.   Recommended that the client call 911 or go to the local emergency department should there be a change in any of these risk factors.     Appearance:   Appropriate    Eye Contact:   Good    Psychomotor Behavior: Normal    Attitude:   Cooperative    Orientation:   All   Speech    Rate / Production: Normal/ Responsive    Volume:  Normal    Mood:    Slightly Anxious   Affect:    Appropriate    Thought Content:  Clear    Thought Form:  Coherent  Logical    Insight:    Good      Medication Review:   No current psychiatric medications prescribed.     Medication Compliance:   N/A     Changes in Health Issues:   None reported.     Chemical Use Review:   Substance Use: Chemical use reviewed. No active concerns identified.      Tobacco Use: No current tobacco use.       Diagnosis:  1. Generalized anxiety disorder      Collateral Reports Completed:   Not Applicable    PLAN: (Patient Tasks / Therapist Tasks / Other)  Eat a banana in the morning along with her regular eating routine.      ANALIA Rosario., MALISSA.                                                         ______________________________________________________________________    Treatment Plan     Client's Name: Eli Hood                    YOB: 2000     Date: 6/14/2021     DSM-V Diagnoses:      300.02 (F41.1) Generalized Anxiety Disorder     Psychosocial / Contextual Factors: The client is a [age] year old,  female. The client resides with her mother, her father and her two sisters in a home in [place], [place]. The client will be a odilon in college at [employer]. The client reported that she began noticing symptoms of anxiety during her sophomore year of high school. The client reported that she would like assistance learning how to manage the symptoms of anxiety that she has been experiencing.     WHODAS: Not Completed     Referral / Collaboration:  Referral to another professional/service is not indicated at this time.     Anticipated number of session or this episode of care: 12     MeasurableTreatment Goal(s) related to diagnosis / functional impairment(s)  Goal 1: The client will learn and apply skills to manage the symptoms of anxiety that she has been experiencing.    I will know I've met my goal when I don't worry so much.       Objective #A                The client will identify specific fears / thoughts that contribute to feeling anxious.  Status: Continued - Date(s): 6/14/2021      Intervention(s)  Therapist will assist the client in identifying specific thoughts/fears that contribute to feeling anxious.     Objective #B  The client will use  worry time  each day for 30 minutes of scheduled worry and then defer obsessive or anxious thinking until  "the next structured worry time.  Status: Continued - Date(s): 6/14/2021      Intervention(s)  Therapist will teach and support the client in learning and using \"worry time\".     Objective #C  The client will use relaxation strategies two times per day to reduce the physical symptoms of anxiety.  Status: Continued - Date(s): 6/14/2021     Intervention(s)  Therapist will teach and support the client in learning and using relaxation strategies.     Objective #D  The client will use cognitive strategies identified in therapy to challenge anxious thoughts.                      Status: Continued - Date(s): 6/14/2021      Intervention(s)  Therapist will assist the client in learning and applying cognitive strategies to challenge anxious thoughts.     Objective #E  The client will be mindful of the body sensations that she is experiencing when she is feeling anxious.  Status: Continued - Date(s): 6/14/2021      Intervention(s)  Therapist will  and support the client in being mindful of the body sensations that she is experiencing when she is feeling anxious.     Objective #F  The client will learn how to use her senses to ground herself in her body.  Status: Continued - Date(s): 6/14/2021      Intervention(s)  Therapist will  and support the client in learning how to use her senses to ground herself in her body.        The client has verbally agreed to the above plan.        Tiffany Delgado M.S.ELIAN., L.I.C.S.W.              June 14, 2021   "

## 2021-09-19 ENCOUNTER — HEALTH MAINTENANCE LETTER (OUTPATIENT)
Age: 21
End: 2021-09-19

## 2021-09-23 ENCOUNTER — VIRTUAL VISIT (OUTPATIENT)
Dept: PSYCHOLOGY | Facility: CLINIC | Age: 21
End: 2021-09-23
Payer: COMMERCIAL

## 2021-09-23 DIAGNOSIS — F41.1 GENERALIZED ANXIETY DISORDER: Primary | ICD-10-CM

## 2021-09-23 PROCEDURE — 90834 PSYTX W PT 45 MINUTES: CPT | Mod: GT | Performed by: SOCIAL WORKER

## 2021-09-24 NOTE — PROGRESS NOTES
Progress Note    Patient Name: Eli Hood  Date: 9/23/2021     Service Type: Individual      Session Start Time: 3:30 PM  Session End Time: 4:15 PM     Session Length: 45 Minutes    Session #: 15 (Third Episode of Care)    Attendees: Client    Service Modality:  Video Visit    Telemedicine Visit: The client's condition can be safely assessed and treated via synchronous audio and visual telemedicine encounter.      Reason for Telemedicine Visit: The client was only offered a telehealth visit.    Originating Site (Client Location): Client's Apartment    Distant Site (Provider Location): Provider Remote Setting    Consent:  The client has verbally consented to the potential risks and benefits of telemedicine (video visit) versus in person care. She agreed that her insurance would be billed. She also agreed to make self-payment for services provided, if needed, and she agreed to take responsibility for payment of non-covered services.     The client would like the video invitation sent by: Text to cell phone: [phone]    Mode of Communication:  Video Conference via [employer]    As the provider, I attest to compliance with applicable laws and regulations related to telemedicine.     Treatment Plan Last Reviewed: 9/23/2021  PHQ-9 / LYNNE-7 : Not Completed    DATA  Interactive Complexity: No  Crisis: No       Progress Since Last Session (Related to Symptoms / Goals / Homework):   Symptoms: Improving : The client reported experiencing continued decreased symptoms of anxiety.    Homework: Partially completed. The client reported that she did not eat a banana in the morning along with her regular eating routine. She indicated that she did purchase bananas. She stated that she is waiting for them to ripen.      Episode of Care Goals: Satisfactory progress - ACTION (Actively working towards change); Intervened by reinforcing change plan / affirming steps taken.     Current /  Ongoing Stressors and Concerns:   The client reported that her overall stress is low.     Treatment Objective(s) Addressed in This Session:   The client will use cognitive strategies identified in therapy to challenge anxious thoughts.     Intervention:   Talked about the client's recent stressors. Discussed and reinforced the client's efforts to challenge anxious thoughts and participate in new activities. Examined the client's decreased symptoms of anxiety.      ASSESSMENT: Current Emotional / Mental Status (status of significant symptoms):   Risk status (Self / Other harm or suicidal ideation)   Client denies current fears or concerns for personal safety.   Client denies current or recent suicidal ideation or behaviors.   Client denies current or recent homicidal ideation or behaviors.   Client denies current or recent self injurious behavior or ideation.   Client denies other safety concerns.   Client reports there has been no change in risk factors since her last session.    Client reports there has been a change in protective factors since her last session. She states that she joined an ultimate frisbee team and a marketing club.   Recommended that the client call 911 or go to the local emergency department should there be a change in any of these risk factors.     Appearance:   Appropriate    Eye Contact:   Good    Psychomotor Behavior: Normal    Attitude:   Cooperative    Orientation:   All   Speech    Rate / Production: Normal/ Responsive    Volume:  Normal    Mood:    Normal   Affect:    Appropriate    Thought Content:  Clear    Thought Form:  Coherent  Logical    Insight:    Good      Medication Review:   No current psychiatric medications prescribed.     Medication Compliance:   N/A     Changes in Health Issues:   None reported.     Chemical Use Review:   Substance Use: Chemical use reviewed. No active concerns identified.      Tobacco Use: No current tobacco use.      Diagnosis:  1. Generalized anxiety  disorder      Collateral Reports Completed:   Not Applicable    PLAN: (Patient Tasks / Therapist Tasks / Other)  N/A      JENN Rosario, MALISSA.                                                         ______________________________________________________________________    Treatment Plan     Client's Name: Eli Hood                    YOB: 2000     Date: 9/23/2021     DSM-V Diagnoses:      300.02 (F41.1) Generalized Anxiety Disorder     Psychosocial / Contextual Factors: The client is a [age] year old,  female. The client resides with her mother, her father and her two sisters in a home in [place], [place]. 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 The client reported that she began noticing symptoms of anxiety during her sophomore year of high school. The client reported that she would like assistance learning how to manage the symptoms of anxiety that she has been experiencing.     WHODAS: Not Completed     Referral / Collaboration:  Referral to another professional/service is not indicated at this time.     Anticipated number of session or this episode of care: 12     MeasurableTreatment Goal(s) related to diagnosis / functional impairment(s)  Goal 1: The client will learn and apply skills to manage the symptoms of anxiety that she has been experiencing.    I will know I've met my goal when I don't worry so much.       Objective #A                The client will identify specific fears / thoughts that contribute to feeling anxious.  Status: Continued - Date(s): 9/23/2021      Intervention(s)  Therapist will assist the client in identifying specific thoughts/fears that contribute to feeling anxious.     Objective #B  The client will use  worry time  each day for 30 minutes of scheduled worry and then defer obsessive or anxious thinking until the next structured worry time.  Status: Continued - Date(s): 9/23/2021      Intervention(s)  Therapist will  "teach and support the client in learning and using \"worry time\".     Objective #C  The client will use relaxation strategies two times per day to reduce the physical symptoms of anxiety.  Status: Continued - Date(s): 9/23/2021     Intervention(s)  Therapist will teach and support the client in learning and using relaxation strategies.     Objective #D  The client will use cognitive strategies identified in therapy to challenge anxious thoughts.                      Status: Continued - Date(s): 9/23/2021      Intervention(s)  Therapist will assist the client in learning and applying cognitive strategies to challenge anxious thoughts.     Objective #E  The client will be mindful of the body sensations that she is experiencing when she is feeling anxious.  Status: Continued - Date(s): 9/23/2021      Intervention(s)  Therapist will  and support the client in being mindful of the body sensations that she is experiencing when she is feeling anxious.     Objective #F  The client will learn how to use her senses to ground herself in her body.  Status: Continued - Date(s): 9/23/2021      Intervention(s)  Therapist will  and support the client in learning how to use her senses to ground herself in her body.        The client has verbally agreed to the above plan.        Tiffany Delgado M.S.W., L.I.C.S.W.              September 24, 2021   "

## 2021-10-13 ENCOUNTER — VIRTUAL VISIT (OUTPATIENT)
Dept: PSYCHOLOGY | Facility: CLINIC | Age: 21
End: 2021-10-13
Payer: COMMERCIAL

## 2021-10-13 DIAGNOSIS — F41.1 GENERALIZED ANXIETY DISORDER: Primary | ICD-10-CM

## 2021-10-13 PROCEDURE — 90834 PSYTX W PT 45 MINUTES: CPT | Mod: GT | Performed by: SOCIAL WORKER

## 2021-10-14 NOTE — PROGRESS NOTES
Progress Note    Patient Name: Eli Hood  Date: 10/13/2021     Service Type: Individual      Session Start Time: 3:30 PM  Session End Time: 4:20 PM     Session Length: 50 Minutes    Session #: 16 (Third Episode of Care)    Attendees: Client    Service Modality:  Video Visit    Telemedicine Visit: The client's condition can be safely assessed and treated via synchronous audio and visual telemedicine encounter.      Reason for Telemedicine Visit: The client was only offered a telehealth visit.    Originating Site (Client Location): Client's Vehicle    Distant Site (Provider Location): Provider Remote Setting    Consent:  The client has verbally consented to the potential risks and benefits of telemedicine (video visit) versus in person care. She agreed that her insurance would be billed. She also agreed to make self-payment for services provided, if needed, and she agreed to take responsibility for payment of non-covered services.     The client would like the video invitation sent by: Text to cell phone: [phone]    Mode of Communication:  Video Conference via [employer]    As the provider, I attest to compliance with applicable laws and regulations related to telemedicine.     Treatment Plan Last Reviewed: 9/23/2021  PHQ-9 / LYNNE-7 : Not Completed    DATA  Interactive Complexity: No  Crisis: No       Progress Since Last Session (Related to Symptoms / Goals / Homework):   Symptoms: Worsening : The client reported experiencing increased symptoms of anxiety and increased feelings of overwhelm.    Homework: N/A      Episode of Care Goals: No improvement - ACTION (Actively working towards change); Intervened by reinforcing change plan / affirming steps taken.     Current / Ongoing Stressors and Concerns:   The client reported that her stress has increased significantly. She stated that her school related work has increased significantly.     Treatment Objective(s)  Addressed in This Session:   The client will use cognitive strategies identified in therapy to challenge anxious thoughts.     Intervention:   Talked about the client's recent stressors. Examined the client's increased symptoms of anxiety and feelings of overwhelm.       ASSESSMENT: Current Emotional / Mental Status (status of significant symptoms):   Risk status (Self / Other harm or suicidal ideation)   Client denies current fears or concerns for personal safety.   Client denies current or recent suicidal ideation or behaviors.   Client denies current or recent homicidal ideation or behaviors.   Client denies current or recent self injurious behavior or ideation.   Client denies other safety concerns.   Client reports there has been no change in risk factors since her last session.    Client reports there has been no change in protective factors since her last session.    Recommended that the client call 911 or go to the local emergency department should there be a change in any of these risk factors.     Appearance:   Appropriate    Eye Contact:   Good    Psychomotor Behavior: Normal    Attitude:   Cooperative    Orientation:   All   Speech    Rate / Production: Normal/ Responsive    Volume:  Normal    Mood:    Anxious    Affect:    Appropriate    Thought Content:  Clear    Thought Form:  Coherent  Logical    Insight:    Good      Medication Review:   No current psychiatric medications prescribed.     Medication Compliance:   N/A     Changes in Health Issues:   Yes: The client reported that she injured both her leg and her ankle while playing ultimate frisbee.     Chemical Use Review:   Substance Use: Chemical use reviewed. No active concerns identified.      Tobacco Use: No current tobacco use.      Diagnosis:  1. Generalized anxiety disorder      Collateral Reports Completed:   Not Applicable    PLAN: (Patient Tasks / Therapist Tasks / Other)  N/A      Tiffany Delgado M.S.W., L.[email or web address].                          "                                ______________________________________________________________________    Treatment Plan     Client's Name: Eli Hood                    YOB: 2000     Date: 9/23/2021     DSM-V Diagnoses:      300.02 (F41.1) Generalized Anxiety Disorder     Psychosocial / Contextual Factors: The client is a [age] year old,  female. The client resides with her mother, her father and her two sisters in a home in [place], [place]. 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 The client reported that she began noticing symptoms of anxiety during her sophomore year of high school. The client reported that she would like assistance learning how to manage the symptoms of anxiety that she has been experiencing.     WHODAS: Not Completed     Referral / Collaboration:  Referral to another professional/service is not indicated at this time.     Anticipated number of session or this episode of care: 12     MeasurableTreatment Goal(s) related to diagnosis / functional impairment(s)  Goal 1: The client will learn and apply skills to manage the symptoms of anxiety that she has been experiencing.    I will know I've met my goal when I don't worry so much.       Objective #A                The client will identify specific fears / thoughts that contribute to feeling anxious.  Status: Continued - Date(s): 9/23/2021      Intervention(s)  Therapist will assist the client in identifying specific thoughts/fears that contribute to feeling anxious.     Objective #B  The client will use  worry time  each day for 30 minutes of scheduled worry and then defer obsessive or anxious thinking until the next structured worry time.  Status: Continued - Date(s): 9/23/2021      Intervention(s)  Therapist will teach and support the client in learning and using \"worry time\".     Objective #C  The client will use relaxation strategies two times per day to reduce the physical symptoms of " anxiety.  Status: Continued - Date(s): 9/23/2021     Intervention(s)  Therapist will teach and support the client in learning and using relaxation strategies.     Objective #D  The client will use cognitive strategies identified in therapy to challenge anxious thoughts.                      Status: Continued - Date(s): 9/23/2021      Intervention(s)  Therapist will assist the client in learning and applying cognitive strategies to challenge anxious thoughts.     Objective #E  The client will be mindful of the body sensations that she is experiencing when she is feeling anxious.  Status: Continued - Date(s): 9/23/2021      Intervention(s)  Therapist will  and support the client in being mindful of the body sensations that she is experiencing when she is feeling anxious.     Objective #F  The client will learn how to use her senses to ground herself in her body.  Status: Continued - Date(s): 9/23/2021      Intervention(s)  Therapist will  and support the client in learning how to use her senses to ground herself in her body.        The client has verbally agreed to the above plan.        Tiffany Delgado M.S.W., L.I.C.S.W.              September 24, 2021

## 2021-11-04 ENCOUNTER — VIRTUAL VISIT (OUTPATIENT)
Dept: PSYCHOLOGY | Facility: CLINIC | Age: 21
End: 2021-11-04
Payer: COMMERCIAL

## 2021-11-04 DIAGNOSIS — F41.1 GENERALIZED ANXIETY DISORDER: Primary | ICD-10-CM

## 2021-11-04 PROCEDURE — 90834 PSYTX W PT 45 MINUTES: CPT | Mod: GT | Performed by: SOCIAL WORKER

## 2021-11-06 NOTE — PROGRESS NOTES
Progress Note    Patient Name: Eli Hood  Date: 11/4/2021     Service Type: Individual      Session Start Time: 3:30 PM  Session End Time: 4:20 PM     Session Length: 50 Minutes    Session #: 17 (Third Episode of Care)    Attendees: Client    Service Modality:  Video Visit    Telemedicine Visit: The client's condition can be safely assessed and treated via synchronous audio and visual telemedicine encounter.      Reason for Telemedicine Visit: The client was only offered a telehealth visit.    Originating Site (Client Location): Client's Apartment    Distant Site (Provider Location): Provider Remote Setting    Consent:  The client has verbally consented to the potential risks and benefits of telemedicine (video visit) versus in person care. She agreed that her insurance would be billed. She also agreed to make self-payment for services provided, if needed, and she agreed to take responsibility for payment of non-covered services.     The client would like the video invitation sent by: Text to cell phone: [phone]    Mode of Communication:  Video Conference via [employer]    As the provider, I attest to compliance with applicable laws and regulations related to telemedicine.     Treatment Plan Last Reviewed: 9/23/2021  PHQ-9 / LYNNE-7 : Not Completed    DATA  Interactive Complexity: No  Crisis: No       Progress Since Last Session (Related to Symptoms / Goals / Homework):   Symptoms: No change : The client reported no change in her symptoms since the previous video visit.    Homework: N/A      Episode of Care Goals: No improvement - ACTION (Actively working towards change); Intervened by reinforcing change plan / affirming steps taken.     Current / Ongoing Stressors and Concerns:   The client reported that her stress remains high.     Treatment Objective(s) Addressed in This Session:   The client will use cognitive strategies identified in therapy to challenge anxious  "thoughts.     Intervention:   Talked about the client's recent stressors. Examined the client's continued symptoms of anxiety and feelings of overwhelm. Identified ways to \"see the light at the end of the tunnel\". Obtained a commitment from the client to cross off, on her calendar, every day that she will not be in [place] during the months of November and December.       ASSESSMENT: Current Emotional / Mental Status (status of significant symptoms):   Risk status (Self / Other harm or suicidal ideation)   Client denies current fears or concerns for personal safety.   Client denies current or recent suicidal ideation or behaviors.   Client denies current or recent homicidal ideation or behaviors.   Client denies current or recent self injurious behavior or ideation.   Client denies other safety concerns.   Client reports there has been no change in risk factors since her last session.    Client reports there has been no change in protective factors since her last session.    Recommended that the client call 911 or go to the local emergency department should there be a change in any of these risk factors.     Appearance:   Appropriate    Eye Contact:   Good    Psychomotor Behavior: Normal    Attitude:   Cooperative    Orientation:   All   Speech    Rate / Production: Normal/ Responsive    Volume:  Normal    Mood:    Anxious    Affect:    Appropriate    Thought Content:  Clear    Thought Form:  Coherent  Logical    Insight:    Good      Medication Review:   No current psychiatric medications prescribed.     Medication Compliance:   N/A     Changes in Health Issues:   Yes: The client reported that she injured both her leg and her ankle while playing ultimate frisbee.     Chemical Use Review:   Substance Use: Chemical use reviewed. No active concerns identified.      Tobacco Use: No current tobacco use.      Diagnosis:  1. Generalized anxiety disorder      Collateral Reports Completed:   Not Applicable    PLAN: (Patient " Tasks / Therapist Tasks / Other)  Cross off, on her calendar, every day that she will not be in [place] during the months of November and December.       ANALIA Rosario., MALISSA.                                                         ______________________________________________________________________    Treatment Plan     Client's Name: Eli Hood                    YOB: 2000     Date: 9/23/2021     DSM-V Diagnoses:      300.02 (F41.1) Generalized Anxiety Disorder     Psychosocial / Contextual Factors: The client is a [age] year old,  female. The client resides with her mother, her father and her two sisters in a home in [place], [place]. 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 The client reported that she began noticing symptoms of anxiety during her sophomore year of high school. The client reported that she would like assistance learning how to manage the symptoms of anxiety that she has been experiencing.     WHODAS: Not Completed     Referral / Collaboration:  Referral to another professional/service is not indicated at this time.     Anticipated number of session or this episode of care: 12     MeasurableTreatment Goal(s) related to diagnosis / functional impairment(s)  Goal 1: The client will learn and apply skills to manage the symptoms of anxiety that she has been experiencing.    I will know I've met my goal when I don't worry so much.       Objective #A                The client will identify specific fears / thoughts that contribute to feeling anxious.  Status: Continued - Date(s): 9/23/2021      Intervention(s)  Therapist will assist the client in identifying specific thoughts/fears that contribute to feeling anxious.     Objective #B  The client will use  worry time  each day for 30 minutes of scheduled worry and then defer obsessive or anxious thinking until the next structured worry time.  Status: Continued - Date(s): 9/23/2021  "     Intervention(s)  Therapist will teach and support the client in learning and using \"worry time\".     Objective #C  The client will use relaxation strategies two times per day to reduce the physical symptoms of anxiety.  Status: Continued - Date(s): 9/23/2021     Intervention(s)  Therapist will teach and support the client in learning and using relaxation strategies.     Objective #D  The client will use cognitive strategies identified in therapy to challenge anxious thoughts.                      Status: Continued - Date(s): 9/23/2021      Intervention(s)  Therapist will assist the client in learning and applying cognitive strategies to challenge anxious thoughts.     Objective #E  The client will be mindful of the body sensations that she is experiencing when she is feeling anxious.  Status: Continued - Date(s): 9/23/2021      Intervention(s)  Therapist will  and support the client in being mindful of the body sensations that she is experiencing when she is feeling anxious.     Objective #F  The client will learn how to use her senses to ground herself in her body.  Status: Continued - Date(s): 9/23/2021      Intervention(s)  Therapist will  and support the client in learning how to use her senses to ground herself in her body.        The client has verbally agreed to the above plan.        Tiffany Delgado M.S.W., L.I.C.S.W.              September 24, 2021   "

## 2021-11-18 ENCOUNTER — VIRTUAL VISIT (OUTPATIENT)
Dept: PSYCHOLOGY | Facility: CLINIC | Age: 21
End: 2021-11-18
Payer: COMMERCIAL

## 2021-11-18 DIAGNOSIS — F41.1 GENERALIZED ANXIETY DISORDER: Primary | ICD-10-CM

## 2021-11-18 PROCEDURE — 90834 PSYTX W PT 45 MINUTES: CPT | Mod: GT | Performed by: SOCIAL WORKER

## 2021-11-19 NOTE — PROGRESS NOTES
Progress Note    Patient Name: Eli Hood  Date: 11/18/2021     Service Type: Individual      Session Start Time: 3:30 PM  Session End Time: 4:20 PM     Session Length: 50 Minutes    Session #: 18 (Third Episode of Care)    Attendees: Client    Service Modality:  Video Visit    Telemedicine Visit: The client's condition can be safely assessed and treated via synchronous audio and visual telemedicine encounter.      Reason for Telemedicine Visit: The client was only offered a telehealth visit.    Originating Site (Client Location): Client's Apartment    Distant Site (Provider Location): Provider Remote Setting    Consent:  The client has verbally consented to the potential risks and benefits of telemedicine (video visit) versus in person care. She agreed that her insurance would be billed. She also agreed to make self-payment for services provided, if needed, and she agreed to take responsibility for payment of non-covered services.     The client would like the video invitation sent by: Text to cell phone: [phone]    Mode of Communication:  Video Conference via [employer]    As the provider, I attest to compliance with applicable laws and regulations related to telemedicine.     Treatment Plan Last Reviewed: 9/23/2021  PHQ-9 / LYNNE-7 : Not Completed    DATA  Interactive Complexity: No  Crisis: No       Progress Since Last Session (Related to Symptoms / Goals / Homework):   Symptoms: Improving : The client reported experiencing slightly decreased symptoms of anxiety.    Homework: Achieved / completed to satisfaction. The client reported that she did cross off, on her calendar, every day that she will not be in [place] during the months of November and December.      Episode of Care Goals: Minimal progress - ACTION (Actively working towards change); Intervened by reinforcing change plan / affirming steps taken.     Current / Ongoing Stressors and Concerns:   The client  "reported that her stress remains high.     Treatment Objective(s) Addressed in This Session:   The client will use cognitive strategies identified in therapy to challenge anxious thoughts.     Intervention:   Talked about the client's recent stressors. Examined the client's slightly decreased symptoms of anxiety and continued feelings of overwhelm. Discussed and reinforced the client's efforts to \"see the light at the end of the tunnel\" by crossing off, on her calendar, every day that she will not be in [place] during the months of November and December. Obtained a commitment from the client to continue to focus on \"seeing the light at the end of the tunnel\".      ASSESSMENT: Current Emotional / Mental Status (status of significant symptoms):   Risk status (Self / Other harm or suicidal ideation)   Client denies current fears or concerns for personal safety.   Client denies current or recent suicidal ideation or behaviors.   Client denies current or recent homicidal ideation or behaviors.   Client denies current or recent self injurious behavior or ideation.   Client denies other safety concerns.   Client reports there has been no change in risk factors since her last session.    Client reports there has been a change in protective factors since her last session. 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   Recommended that the client call 911 or go to the local emergency department should there be a change in any of these risk factors.     Appearance:   Appropriate    Eye Contact:   Good    Psychomotor Behavior: Normal    Attitude:   Cooperative    Orientation:   All   Speech    Rate / Production: Normal/ Responsive    Volume:  Normal    Mood:    Mildly Anxious   Affect:    Appropriate    Thought Content:  Clear    Thought Form:  Coherent  Logical    Insight:    Good      Medication Review:   No current psychiatric medications prescribed.     Medication " "Compliance:   N/A     Changes in Health Issues:   None reported.     Chemical Use Review:   Substance Use: Chemical use reviewed. No active concerns identified.      Tobacco Use: No current tobacco use.      Diagnosis:  1. Generalized anxiety disorder      Collateral Reports Completed:   Not Applicable    PLAN: (Patient Tasks / Therapist Tasks / Other)  Continue to focus on \"seeing the light at the end of the tunnel\".      ANALIA Rosario., MALISSA.                                                         ______________________________________________________________________    Treatment Plan     Client's Name: Eli Hood                    YOB: 2000     Date: 9/23/2021     DSM-V Diagnoses:      300.02 (F41.1) Generalized Anxiety Disorder     Psychosocial / Contextual Factors: The client is a [age] year old,  female. The client resides with her mother, her father and her two sisters in a home in [place], [place]. 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 The client reported that she began noticing symptoms of anxiety during her sophomore year of high school. The client reported that she would like assistance learning how to manage the symptoms of anxiety that she has been experiencing.     WHODAS: Not Completed     Referral / Collaboration:  Referral to another professional/service is not indicated at this time.     Anticipated number of session or this episode of care: 12     MeasurableTreatment Goal(s) related to diagnosis / functional impairment(s)  Goal 1: The client will learn and apply skills to manage the symptoms of anxiety that she has been experiencing.    I will know I've met my goal when I don't worry so much.       Objective #A                The client will identify specific fears / thoughts that contribute to feeling anxious.  Status: Continued - Date(s): 9/23/2021      Intervention(s)  Therapist will assist the client in identifying specific " "thoughts/fears that contribute to feeling anxious.     Objective #B  The client will use  worry time  each day for 30 minutes of scheduled worry and then defer obsessive or anxious thinking until the next structured worry time.  Status: Continued - Date(s): 9/23/2021      Intervention(s)  Therapist will teach and support the client in learning and using \"worry time\".     Objective #C  The client will use relaxation strategies two times per day to reduce the physical symptoms of anxiety.  Status: Continued - Date(s): 9/23/2021     Intervention(s)  Therapist will teach and support the client in learning and using relaxation strategies.     Objective #D  The client will use cognitive strategies identified in therapy to challenge anxious thoughts.                      Status: Continued - Date(s): 9/23/2021      Intervention(s)  Therapist will assist the client in learning and applying cognitive strategies to challenge anxious thoughts.     Objective #E  The client will be mindful of the body sensations that she is experiencing when she is feeling anxious.  Status: Continued - Date(s): 9/23/2021      Intervention(s)  Therapist will  and support the client in being mindful of the body sensations that she is experiencing when she is feeling anxious.     Objective #F  The client will learn how to use her senses to ground herself in her body.  Status: Continued - Date(s): 9/23/2021      Intervention(s)  Therapist will  and support the client in learning how to use her senses to ground herself in her body.        The client has verbally agreed to the above plan.        OMEGA Rosario.S.ELIAN., L.I.C.S.W.              September 24, 2021   "

## 2021-12-08 ENCOUNTER — VIRTUAL VISIT (OUTPATIENT)
Dept: PSYCHOLOGY | Facility: CLINIC | Age: 21
End: 2021-12-08
Payer: COMMERCIAL

## 2021-12-08 DIAGNOSIS — F41.1 GENERALIZED ANXIETY DISORDER: Primary | ICD-10-CM

## 2021-12-08 PROCEDURE — 90834 PSYTX W PT 45 MINUTES: CPT | Mod: GT | Performed by: SOCIAL WORKER

## 2021-12-08 NOTE — PROGRESS NOTES
Discharge Summary  Multiple Sessions    Client Name: Eli Hood MRN#: [id number] YOB: 2000    Discharge Date:   December 8, 2021    Service Modality: Video Visit    Telemedicine Visit: The client's condition can be safely assessed and treated via synchronous audio and visual telemedicine encounter.      Reason for Telemedicine Visit: The client was only offered a telehealth visit.    Originating Site (Client Location): Client's Apartment    Distant Site (Provider Location): Provider Remote Setting    Consent:  The client has verbally consented to the potential risks and benefits of telemedicine (video visit) versus in person care. She agreed that her insurance would be billed. She also agreed to make self-payment for services provided, if needed, and she agreed to take responsibility for payment of non-covered services     The client would like the video invitation sent by:  My Chart    Mode of Communication:  Video Conference via [employer]    As the provider I attest to compliance with applicable laws and regulations related to telemedicine.    Service Type: Individual      Session Start Time: 9:30 AM  Session End Time: 10:10 AM      Session Length: 40 Minutes     Session #: 19 (Third Episode of Care)     Attendees: Client    Focus of Treatment Objective(s):  The client's presenting concerns included struggling to manage symptoms associated with generalized anxiety disorder.  Stage of change at time of discharge: ACTION (Actively working towards change)    Medication Adherence:  N/A    Chemical Use:  No    Assessment: Current Emotional / Mental Status (status of significant symptoms):    Risk status (Self / Other harm or suicidal ideation)  Client denies current fears or concerns for personal safety.  Client denies current or recent suicidal ideation or behaviors.  Client denies current or recent homicidal ideation or behaviors.  Client denies current or recent self injurious  behavior or ideation.  Client denies other safety concerns.    Appearance:   Appropriate   Eye Contact:   Good   Psychomotor Behavior: Normal   Attitude:   Cooperative   Orientation:   All  Speech   Rate / Production: Normal/ Responsive   Volume:  Normal   Mood:    Normal  Affect:    Appropriate   Thought Content:  Clear   Thought Form:  Coherent  Logical   Insight:   Good     DSM5 Diagnoses: (Sustained by DSM5 Criteria Listed Above)  Diagnoses: 300.02 (F41.1) Generalized Anxiety Disorder  Psychosocial & Contextual Factors: The client is a [age] year old,  female. The client resides with her mother, her father and her two sisters in a home in [place], [place]. 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 She is also employed part-time at [employer].   WHODAS 2.0 (12 item) Score: Not Completed  Reason for Discharge:  This writer has resigned from [employer].    Aftercare Plan:  The client may resume counseling services at any time in the future by calling the [facility] intake office at [phone].      Tiffany Delgado, M.S.W., L.I.C.S.W.  December 8, 2021

## 2022-01-08 ENCOUNTER — HEALTH MAINTENANCE LETTER (OUTPATIENT)
Age: 22
End: 2022-01-08

## 2022-11-20 ENCOUNTER — HEALTH MAINTENANCE LETTER (OUTPATIENT)
Age: 22
End: 2022-11-20

## 2023-04-15 ENCOUNTER — HEALTH MAINTENANCE LETTER (OUTPATIENT)
Age: 23
End: 2023-04-15

## 2024-06-22 ENCOUNTER — HEALTH MAINTENANCE LETTER (OUTPATIENT)
Age: 24
End: 2024-06-22